# Patient Record
Sex: FEMALE
[De-identification: names, ages, dates, MRNs, and addresses within clinical notes are randomized per-mention and may not be internally consistent; named-entity substitution may affect disease eponyms.]

---

## 2024-05-20 PROBLEM — Z00.00 ENCOUNTER FOR PREVENTIVE HEALTH EXAMINATION: Status: ACTIVE | Noted: 2024-05-20

## 2024-05-30 ENCOUNTER — APPOINTMENT (OUTPATIENT)
Dept: ENDOCRINOLOGY | Facility: CLINIC | Age: 29
End: 2024-05-30
Payer: COMMERCIAL

## 2024-05-30 VITALS
HEIGHT: 62 IN | SYSTOLIC BLOOD PRESSURE: 126 MMHG | DIASTOLIC BLOOD PRESSURE: 75 MMHG | HEART RATE: 80 BPM | BODY MASS INDEX: 26.13 KG/M2 | WEIGHT: 142 LBS

## 2024-05-30 DIAGNOSIS — R79.89 OTHER SPECIFIED ABNORMAL FINDINGS OF BLOOD CHEMISTRY: ICD-10-CM

## 2024-05-30 DIAGNOSIS — N92.6 IRREGULAR MENSTRUATION, UNSPECIFIED: ICD-10-CM

## 2024-05-30 DIAGNOSIS — R63.5 ABNORMAL WEIGHT GAIN: ICD-10-CM

## 2024-05-30 PROCEDURE — 99205 OFFICE O/P NEW HI 60 MIN: CPT

## 2024-05-30 NOTE — HISTORY OF PRESENT ILLNESS
[FreeTextEntry1] : 29 y/o F w no PMH here for initial evaluation and management of weight issues and irregular periods generally feels well and endorses no acute complaints. denies to lifelong issues w/ weight or hx of childhood obesity. max weight is now. reports she eats 2 meals a day, usually skips brk, largest meal is dinner ~7 PM.  usually cooks for herself during the week. preferred starches are bread and rice. denies to soda consumption. denies steroid/AA use in the past. no past weight loss interventions. physically active. does not carb/calorie count.  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 stopped OCPs ~ 2 y/a after total cortisol was elevated during functional MD assessment. repeat Total cortisol in AM on 1/2024 was 28.  She otherwise denies any f/c, CP, SOB, palpitations, tremors, depressed mood, anxiety, palpitations, n/v, stool/urinary abn, skin/weight changes, heat/cold intolerance, HAs, breast/nipple changes, polyuria/polydipsia/nocturia or other complaints.

## 2024-05-30 NOTE — ASSESSMENT
[Long Term Vascular Complications] : long term vascular complications of diabetes [Importance of Diet and Exercise] : importance of diet and exercise to improve glycemic control, achieve weight loss and improve cardiovascular health [Weight Loss] : weight loss [FreeTextEntry1] : Overweight,  risk of metabolic syndrome and future complications reviewed. Discussed options including meds, bariatric surgery and lifestyle modification. RB and alternatives discussed. Questions answered and she verbalized understanding. start hypocaloric, hypocarb diet in addition to exercise regimen.   irregular menses FG score <12, no collateral data,  no plans for pregnancy. not on OCPs. r/o pituitary/adrenal endocrinopathies. check 24 hour UFC/[facility].  Verbalized understanding and agrees with treatment plan, will contact MD and seek emergency medical care if condition changes.

## 2024-06-13 LAB
DHEA-S SERPL-MCNC: 152 UG/DL
ESTIMATED AVERAGE GLUCOSE: 111 MG/DL
ESTRADIOL SERPL-MCNC: 83 PG/ML
FSH SERPL-MCNC: 3.1 IU/L
HBA1C MFR BLD HPLC: 5.5 %
PROLACTIN SERPL-MCNC: 15.6 NG/ML
PROLACTIN SERPL-MCNC: 16.4 NG/ML
T4 FREE SERPL-MCNC: 1 NG/DL
TESTOST FREE SERPL-MCNC: 0.8 PG/ML
TESTOST SERPL-MCNC: 19.2 NG/DL
THYROGLOB AB SERPL-ACNC: <20 IU/ML
THYROPEROXIDASE AB SERPL IA-ACNC: 11.6 IU/ML
TSH SERPL-ACNC: 2.49 UIU/ML

## 2024-06-28 LAB
CORTIS 24H UR-MCNC: 24 H
CORTIS 24H UR-MRATE: 7.7 MCG/24 H
CORTIS SAL-MCNC: NORMAL
SPECIMEN VOL 24H UR: 700 ML